# Patient Record
(demographics unavailable — no encounter records)

---

## 2018-07-03 NOTE — CT
CT ABDOMEN AND PELVIS WITH IV CONTRAST:

 

HISTORY: 

Left lower quadrant pain.

 

FINDINGS: 

Lung bases are clear.  Lobular cysts are apparent within the left liver lobe.  Spleen, kidneys, adren
al glands, and pancreas are within normal limits.  Scattered nonspecific lymph nodes.  Urinary bladde
r is decompressed.  Calcifications in the uterus have the appearance of fibroids.  Degenerative rios
es lumbar spine.

 

Lack of oral contrast limits evaluation of the bowel.  No evidence of obstruction.  Scattered diverti
cula arise from the colon.  There is subtle circumferential wall thickening involving the upper sigmo
id colon with stranding in the adjacent fat in the region of diverticula.  No extraluminal fluid lizzeth
ection are apparent, with a small amount of fluid noted at the left adnexa.  The stranding from the b
owel also involves the left adnexa, including the fallopian tube.  

 

IMPRESSION: 

1.  Left lower quadrant inflammation involves both the thickened sigmoid colon and the left adnexa.  
Noncomplicated diverticulitis is the favored etiology, rather than pelvic inflammatory disease.

 

2.  Atherosclerosis.

 

3.  Calcified uterine fibroids.

 

POS: JADE